# Patient Record
Sex: MALE | Race: WHITE | NOT HISPANIC OR LATINO | Employment: UNEMPLOYED | URBAN - METROPOLITAN AREA
[De-identification: names, ages, dates, MRNs, and addresses within clinical notes are randomized per-mention and may not be internally consistent; named-entity substitution may affect disease eponyms.]

---

## 2023-06-28 ENCOUNTER — HOSPITAL ENCOUNTER (EMERGENCY)
Facility: CLINIC | Age: 28
Discharge: HOME | End: 2023-06-28
Attending: EMERGENCY MEDICINE | Admitting: EMERGENCY MEDICINE
Payer: MEDICAID

## 2023-06-28 VITALS
OXYGEN SATURATION: 95 % | HEIGHT: 69 IN | BODY MASS INDEX: 43.25 KG/M2 | HEART RATE: 116 BPM | SYSTOLIC BLOOD PRESSURE: 128 MMHG | WEIGHT: 292 LBS | RESPIRATION RATE: 16 BRPM | DIASTOLIC BLOOD PRESSURE: 89 MMHG | TEMPERATURE: 98.8 F

## 2023-06-28 DIAGNOSIS — R00.2 PALPITATIONS: ICD-10-CM

## 2023-06-28 DIAGNOSIS — F41.9 ANXIETY: ICD-10-CM

## 2023-06-28 PROCEDURE — 99283 EMERGENCY DEPT VISIT LOW MDM: CPT | Performed by: EMERGENCY MEDICINE

## 2023-06-28 PROCEDURE — 250N000013 HC RX MED GY IP 250 OP 250 PS 637: Performed by: EMERGENCY MEDICINE

## 2023-06-28 PROCEDURE — 93010 ELECTROCARDIOGRAM REPORT: CPT | Performed by: EMERGENCY MEDICINE

## 2023-06-28 PROCEDURE — 93005 ELECTROCARDIOGRAM TRACING: CPT | Performed by: EMERGENCY MEDICINE

## 2023-06-28 PROCEDURE — 99283 EMERGENCY DEPT VISIT LOW MDM: CPT | Mod: 25 | Performed by: EMERGENCY MEDICINE

## 2023-06-28 RX ORDER — VENLAFAXINE HYDROCHLORIDE 75 MG/1
75 CAPSULE, EXTENDED RELEASE ORAL
COMMUNITY
Start: 2023-04-03

## 2023-06-28 RX ORDER — LORAZEPAM 1 MG/1
1 TABLET ORAL ONCE
Status: COMPLETED | OUTPATIENT
Start: 2023-06-28 | End: 2023-06-28

## 2023-06-28 RX ORDER — LORAZEPAM 1 MG/1
.5-1 TABLET ORAL EVERY 8 HOURS PRN
Qty: 15 TABLET | Refills: 0 | Status: SHIPPED | OUTPATIENT
Start: 2023-06-28

## 2023-06-28 RX ORDER — LORAZEPAM 1 MG/1
.5-1 TABLET ORAL EVERY 8 HOURS PRN
Qty: 15 TABLET | Refills: 0 | Status: SHIPPED | OUTPATIENT
Start: 2023-06-28 | End: 2023-06-28

## 2023-06-28 RX ADMIN — LORAZEPAM 1 MG: 1 TABLET ORAL at 17:07

## 2023-06-28 ASSESSMENT — ACTIVITIES OF DAILY LIVING (ADL): ADLS_ACUITY_SCORE: 35

## 2023-06-28 NOTE — ED PROVIDER NOTES
"  History     Chief Complaint   Patient presents with     Palpitations     Panic Attack     HPI  Braulio Ng is a 27 year old male with history of anxiety who presents emergency department for evaluation of anxiety, an anxiety attack with palpitations.  3 days ago while at rest he had a brief, seconds, sensation of 'the upper and lower chambers of his heart beating in dyssynchrony', which has heightened his anxiety since that time.  In the past he has only had frequent intermittent palpations which are sensations of a skipped beats.  He has never had syncope.  When he had his brief episode of palpitations 3 days ago he had no associated chest pain, shortness of breath, nausea, sweating or diaphoresis.  No exertional chest pains or other symptoms of angina or ACS.  No recent URI symptoms or signs or symptoms of PE/DVT.  No family history of unusual heart disease or sudden cardiac death.  No other pertinent history or acute complaints or concerns.      Allergies:  Allergies   Allergen Reactions     Penicillins Nausea and Vomiting     Erythromycin Rash       Problem List:    There are no problems to display for this patient.       Past Medical History:    Past Medical History:   Diagnosis Date     Anxiety        Past Surgical History:    History reviewed. No pertinent surgical history.    Family History:    History reviewed. No pertinent family history.    Social History:  Marital Status:  Single [1]        Medications:    LORazepam (ATIVAN) 1 MG tablet  venlafaxine (EFFEXOR XR) 75 MG 24 hr capsule        Review of Systems  As mentioned in the HPI, in addition focused review of systems was negative.    Physical Exam   BP: 125/88  Pulse: 120  Temp: 99  F (37.2  C)  Resp: 18  Height: 175.3 cm (5' 9\")  Weight: 132.5 kg (292 lb)  SpO2: 99 %      Physical Exam  Vitals and nursing note reviewed.   Constitutional:       General: He is not in acute distress.     Appearance: Normal appearance. He is well-developed. He is " not ill-appearing or diaphoretic.   HENT:      Head: Normocephalic and atraumatic.      Right Ear: External ear normal.      Left Ear: External ear normal.   Eyes:      General: No scleral icterus.     Extraocular Movements: Extraocular movements intact.      Conjunctiva/sclera: Conjunctivae normal.   Neck:      Thyroid: No thyromegaly.      Trachea: No tracheal deviation.   Cardiovascular:      Rate and Rhythm: Normal rate and regular rhythm.      Heart sounds: Normal heart sounds. No murmur heard.     No friction rub. No gallop.   Pulmonary:      Effort: Pulmonary effort is normal. No respiratory distress.      Breath sounds: Normal breath sounds. No wheezing, rhonchi or rales.   Musculoskeletal:         General: No swelling or tenderness. Normal range of motion.      Cervical back: Normal range of motion and neck supple.      Right lower leg: No edema.      Left lower leg: No edema.   Skin:     General: Skin is warm and dry.      Coloration: Skin is not pale.      Findings: No erythema or rash.   Neurological:      General: No focal deficit present.      Mental Status: He is alert and oriented to person, place, and time.   Psychiatric:         Behavior: Behavior normal.      Comments: Anxious affect.         ED Course           Procedures              EKG Interpretation:      Interpreted by Rafael Morillo MD  Time reviewed: 4:31 PM  Symptoms at time of EKG: Palpitations, anxiety  Rhythm: normal sinus   Rate: normal  Axis: normal  Ectopy: none  Conduction: normal  ST Segments/ T Waves: Non-specific ST-T wave changes  Q Waves: none  Comparison to prior: No old EKG available  Clinical Impression: Normal sinus rhythm, nonspecific ST-T wave changes, no old EKGs available for comparison.         No results found for this or any previous visit (from the past 24 hour(s)).    Medications   LORazepam (ATIVAN) tablet 1 mg (1 mg Oral $Given 6/28/23 1583)     He reports she has no medical insurance and was worried about the  cost of his evaluation today and refused laboratory work-up as recommended. He also declined referral for a cardiac monitor study to help facilitate his outpatient follow-up.  He did agree to follow-up with his primary care provider in [facility] as soon as possible for reevaluation and for follow-up.    Assessments & Plan (with Medical Decision Making)   27 year old male with history of anxiety who presents emergency department for evaluation of anxiety, an anxiety attack with palpitations.  3 days ago while at rest he had a brief, seconds, sensation of 'the upper and lower chambers of his heart beating in dyssynchrony', which has heightened his anxiety since that time.  In the past he has only had frequent intermittent palpations which are sensations of a skipped beats.  He has never had syncope.  When he had his brief episode of palpitations 3 days ago he had no chest pain or other symptoms of angina or ACS.    Unremarkable EKG and cardiac monitoring in the ED. He reports he has no medical insurance and iss worried about the cost of his evaluation today and declined laboratory work-up as recommended. He also declined referral for a cardiac monitor study to help facilitate his outpatient follow-up.  He did agree to follow-up with his primary care provider in [facility] as soon as possible for reevaluation and for follow-up. History and exam c/w anxiety. Doubt dysrhythmia, atypical ACS, PE/DVT or other emergent disease process.  I will Rx a small number of Ativan and have him increase Effexor/venlafaxine by one half, from 75 mg daily to 112.5 mg daily and he will follow-up with his primary care provider who prescribes this for him in the next week.  He declined laboratory evaluation referral for cardiac monitor study as was recommended as he is concerned about the cost of his evaluation and care because he currently has no health insurance, he is a student.  He agrees to return for recurrent palpitations,  syncope, any signs or symptoms of ACS or any new problems or concerns.    I have reviewed the nursing notes.    I have reviewed the findings, diagnosis, plan and need for follow up with the patient.    Current Discharge Medication List      START taking these medications    Details   LORazepam (ATIVAN) 1 MG tablet Take 0.5-1 tablets (0.5-1 mg) by mouth every 8 hours as needed for anxiety (or insomnia)  Qty: 15 tablet, Refills: 0             Final diagnoses:   Anxiety   Palpitations       6/28/2023   [facility] EMERGENCY DEPT     Rafael Morillo MD  07/02/23 9169

## 2023-06-28 NOTE — ED TRIAGE NOTES
Pt here with increased anxiety and palpitations. Pt states that he takes his meds daily. No chest pain. Denies smoking, no vaping. Minimal ETOH. No drug use.      Triage Assessment     Row Name 06/28/23 8607       Triage Assessment (Adult)    Airway WDL WDL       Respiratory WDL    Respiratory WDL WDL       Skin Circulation/Temperature WDL    Skin Circulation/Temperature WDL WDL       Cardiac WDL    Cardiac WDL rhythm    Pulse Rate & Regularity tachycardic       Peripheral/Neurovascular WDL    Peripheral Neurovascular WDL WDL       Cognitive/Neuro/Behavioral WDL    Cognitive/Neuro/Behavioral WDL WDL

## 2023-06-28 NOTE — DISCHARGE INSTRUCTIONS
Increase Effexor/Venlafaxine by one half, from 75 mg (1 tablet daily) to 112.5 mg daily (1.5 tablets daily).  Report this change in follow-up with your primary care provider in the next 1 week.

## 2023-06-28 NOTE — ED NOTES
Pt presents to ED, states he felt a palpitation 3 days ago, palpation has resolved, but now feels anxious and states he can't seem to get his anxiety down without being evaluated.

## 2023-06-28 NOTE — ED TRIAGE NOTES
Pt reports he has felt a few palpitations and has anxiety.     Writer and provider spoke with pt about treatment and diagnostic options in ED vs. UC. Pt agreed to be evaluated in the ED.

## 2024-04-05 ENCOUNTER — HOSPITAL ENCOUNTER (EMERGENCY)
Facility: CLINIC | Age: 29
Discharge: HOME | End: 2024-04-05
Attending: PHYSICIAN ASSISTANT | Admitting: PHYSICIAN ASSISTANT
Payer: COMMERCIAL

## 2024-04-05 VITALS
DIASTOLIC BLOOD PRESSURE: 85 MMHG | RESPIRATION RATE: 17 BRPM | HEART RATE: 117 BPM | SYSTOLIC BLOOD PRESSURE: 152 MMHG | OXYGEN SATURATION: 100 % | TEMPERATURE: 98.1 F

## 2024-04-05 DIAGNOSIS — F41.9 ANXIETY: ICD-10-CM

## 2024-04-05 PROCEDURE — G0463 HOSPITAL OUTPT CLINIC VISIT: HCPCS | Performed by: PHYSICIAN ASSISTANT

## 2024-04-05 PROCEDURE — 99214 OFFICE O/P EST MOD 30 MIN: CPT | Performed by: PHYSICIAN ASSISTANT

## 2024-04-05 RX ORDER — VENLAFAXINE HYDROCHLORIDE 75 MG/1
75 CAPSULE, EXTENDED RELEASE ORAL DAILY
Qty: 30 CAPSULE | Refills: 0 | Status: SHIPPED | OUTPATIENT
Start: 2024-04-05 | End: 2024-05-05

## 2024-04-05 ASSESSMENT — COLUMBIA-SUICIDE SEVERITY RATING SCALE - C-SSRS
2. HAVE YOU ACTUALLY HAD ANY THOUGHTS OF KILLING YOURSELF IN THE PAST MONTH?: NO
6. HAVE YOU EVER DONE ANYTHING, STARTED TO DO ANYTHING, OR PREPARED TO DO ANYTHING TO END YOUR LIFE?: NO
1. IN THE PAST MONTH, HAVE YOU WISHED YOU WERE DEAD OR WISHED YOU COULD GO TO SLEEP AND NOT WAKE UP?: NO

## 2024-04-05 ASSESSMENT — ACTIVITIES OF DAILY LIVING (ADL): ADLS_ACUITY_SCORE: 35

## 2024-04-05 NOTE — ED PROVIDER NOTES
History   No chief complaint on file.    HPI  Braulio Ng is a 28 year old male with history of anxiety who presents to urgent care requesting refill of venlafaxine XR.  Patient reports he has been on medication for approximate last 10 years states is scheduled to run out of current prescription within the next 2 days.  His last dose was yesterday.  He denies any current thoughts of self-harm or suicide.  He states that primary provider who typically prescribes medication is out of the office for the next several weeks.  He is in the process of reestablishing with psychologist/psychiatrist.      Allergies:  Allergies   Allergen Reactions    Penicillins Nausea and Vomiting    Erythromycin Rash       Problem List:    There are no problems to display for this patient.       Past Medical History:    Past Medical History:   Diagnosis Date    Anxiety        Past Surgical History:    No past surgical history on file.    Family History:    No family history on file.    Social History:  Marital Status:  Single [1]        Medications:    venlafaxine (EFFEXOR XR) 75 MG 24 hr capsule  LORazepam (ATIVAN) 1 MG tablet  venlafaxine (EFFEXOR XR) 75 MG 24 hr capsule      Review of Systems  Per HPI    Physical Exam   BP: (!) 152/85  Pulse: 117  Temp: 98.1  F (36.7  C)  Resp: 17  SpO2: 100 %  Physical Exam    GENERAL APPEARANCE: healthy, alert and no distress  EYES: EOMI,  PERRL, conjunctiva clear  RESP: lungs clear to auscultation - no rales, rhonchi or wheezes  CV: tachycardia, regular rhythm, normal S1 S2, no murmur noted  NEURO: Normal strength and tone, sensory exam grossly normal,  normal speech and mentation  PSYCH: Normal attention, mood and affect normal.  Normal speech cognition and memory  SKIN: no suspicious lesions or rashes    ED Course        Procedures       Critical Care time:  none        No results found for this or any previous visit (from the past 24 hour(s)).    Medications - No data to  display    Assessments & Plan (with Medical Decision Making)     I have reviewed the nursing notes.    I have reviewed the findings, diagnosis, plan and need for follow up with the patient.     New Prescriptions    VENLAFAXINE (EFFEXOR XR) 75 MG 24 HR CAPSULE    Take 1 capsule (75 mg) by mouth daily for 30 days     Final diagnoses:   Anxiety     28-year-old male with history of anxiety presents to urgent care requesting refill of venlafaxine stating he is scheduled to run out within the next 48 hours.  He had elevated blood pressure, tachycardia upon arrival, remainder vital signs were stable.  Physical exam findings were benign.  Patient denies any suicidal thoughts at this time.  Given potential risk factors of withdrawing from medication I did agree to provide description for 1 month supply.  All further refills through PCP or mental health provider, mental health  referral placed. Worrisome reasons to return to ER/UC discussed.    Disclaimer: This note consists of symbols derived from keyboarding, dictation, and/or voice recognition software. As a result, there may be errors in the script that have gone undetected.  Please consider this when interpreting information found in the chart.      4/5/2024   [facility] EMERGENCY DEPT       Justa Chavarria PA-C  04/09/24 0939

## 2024-04-29 ASSESSMENT — PATIENT HEALTH QUESTIONNAIRE - PHQ9
SUM OF ALL RESPONSES TO PHQ QUESTIONS 1-9: 7
10. IF YOU CHECKED OFF ANY PROBLEMS, HOW DIFFICULT HAVE THESE PROBLEMS MADE IT FOR YOU TO DO YOUR WORK, TAKE CARE OF THINGS AT HOME, OR GET ALONG WITH OTHER PEOPLE: SOMEWHAT DIFFICULT
SUM OF ALL RESPONSES TO PHQ QUESTIONS 1-9: 7

## 2024-04-30 ENCOUNTER — VIRTUAL VISIT (OUTPATIENT)
Dept: BEHAVIORAL HEALTH | Facility: CLINIC | Age: 29
End: 2024-04-30
Payer: COMMERCIAL

## 2024-04-30 DIAGNOSIS — F41.9 ANXIETY: ICD-10-CM

## 2024-04-30 PROCEDURE — 90834 PSYTX W PT 45 MINUTES: CPT | Mod: 95

## 2024-04-30 ASSESSMENT — COLUMBIA-SUICIDE SEVERITY RATING SCALE - C-SSRS
2. HAVE YOU ACTUALLY HAD ANY THOUGHTS OF KILLING YOURSELF?: NO
1. HAVE YOU WISHED YOU WERE DEAD OR WISHED YOU COULD GO TO SLEEP AND NOT WAKE UP?: NO

## 2024-04-30 NOTE — PROGRESS NOTES
[facility] Primary Care: Integrated Behavioral Health  2024    Behavioral Health Clinician Progress Note    Patient Name: Braulio Ng        Service Type:  Individual      Service Location:   Phone call (patient / identified key support person reached)     Session Start Time: 1:30pm  Session End Time: 2:10pm      Session Length: 38 - 52      Attendees: Patient     Service Modality:  Video Visit:      Provider verified identity through the following two step process.  Patient provided:  Patient  and Patient address    Telemedicine Visit: The patient's condition can be safely assessed and treated via synchronous audio and visual telemedicine encounter.      Reason for Telemedicine Visit: Patient has requested telehealth visit    Originating Site (Patient Location): Patient's home    Distant Site (Provider Location): [facility] MENTAL [facility] & ADDICTION [facility]    Consent:  The patient/guardian has verbally consented to: the potential risks and benefits of telemedicine (video visit) versus in person care; bill my insurance or make self-payment for services provided; and responsibility for payment of non-covered services.     Patient would like the video invitation sent by:  My Chart    Mode of Communication:  Video Conference via [employer]    Distant Location (Provider):  On-site    As the provider I attest to compliance with applicable laws and regulations related to telemedicine.    Visit Activities (Refresh list every visit): NEW, [facility] Only, and Referral - Mental Health    Diagnostic Assessment Date: Will complete in the next few sessions, not completed due to time constraints.    Treatment Plan Review Date: Will complete in the next few sessions, not completed due to time constraints.    See Flowsheets for today's PHQ-9 and MAGNOLIA-7 results  Previous PHQ-9:       2024     1:53 PM   PHQ-9 SCORE   PHQ-9 Total Score MyChart 7 (Mild depression)   PHQ-9 Total Score  7     DATA  Extended Session (60+ minutes): No  Interactive Complexity: No  Crisis: No  [facility] Patient: No    Treatment Objective(s) Addressed in This Session:  Target Behavior(s): disease management/lifestyle changes ongoing anxiety     Anxiety: will experience a reduction in anxiety, will develop more effective coping skills to manage anxiety symptoms, will develop healthy cognitive patterns and beliefs, and will increase ability to function adaptively    Current Stressors / Issues:  Pt stated that he is interested in therapy to address his anxiety.  Discuss and provide education on coping skills for him. Prompt follow through.  Provide education on ART therapy and how to find a therapist who specializes in it.  Encourage follow through.      Progress on Treatment Objective(s) / Homework:  New Objective established this session - CONTEMPLATION (Considering change and yet undecided); Intervened by assessing the negative and positive thinking (ambivalence) about behavior change    Motivational Interviewing    MI Intervention: Expressed Empathy/Understanding, Supported Autonomy, Collaboration, Evocation, and Reflections: simple and complex     Change Talk Expressed by the Patient: Desire to change Ability to change    Provider Response to Change Talk: A - Affirmed patient's thoughts, decisions, or attempts at behavior change and R - Reflected patient's change talk    Assessments completed prior to visit:  The following assessments were completed by patient for this visit:  PHQ9:       4/29/2024     1:53 PM   PHQ-9 SCORE   PHQ-9 Total Score MyChart 7 (Mild depression)   PHQ-9 Total Score 7     GAD7:        No data to display              CAGE-AID:       4/23/2024     6:04 PM   CAGE-AID Total Score   Total Score 0   Total Score MyChart 0 (A total score of 2 or greater is considered clinically significant)     PROMIS 10-Global Health (only subscores and total score):       4/23/2024     6:03 PM   PROMIS-10 Scores Only    Global Mental Health Score 14   Global Physical Health Score 15   PROMIS TOTAL - SUBSCORES 29     [place] Suicide Severity Rating Scale (Lifetime/Recent)      4/5/2024     2:34 PM 4/30/2024     1:36 PM   [place] Suicide Severity Rating (Lifetime/Recent)   Q1 Wished to be Dead (Past Month) 0-->no    Q2 Suicidal Thoughts (Past Month) 0-->no    Q6 Suicide Behavior (Lifetime) 0-->no    Level of Risk per Screen no risks indicated    Q1 Wish to be Dead (Lifetime)  N   Q2 Non-Specific Active Suicidal Thoughts (Lifetime)  N       Care Plan review completed: Yes    Medication Review:  No current psychiatric medications prescribed    Medication Compliance:  NA    Changes in Health Issues:   None reported    Chemical Use Review:   Substance Use: Chemical use reviewed, no active concerns identified      Tobacco Use: No current tobacco use.      Assessment: Current Emotional / Mental Status (status of significant symptoms):  Risk status (Self / Other harm or suicidal ideation)  Patient denies a history of suicidal ideation, suicide attempts, self-injurious behavior, homicidal ideation, homicidal behavior, and and other safety concerns  Patient denies current fears or concerns for personal safety.  Patient denies current or recent suicidal ideation or behaviors.  Patient denies current or recent homicidal ideation or behaviors.  Patient denies current or recent self injurious behavior or ideation.  Patient denies other safety concerns.  A safety and risk management plan has not been developed at this time, however patient was encouraged to call [facility] should there be a change in any of these risk factors.    Appearance:   Unable to assess   Eye Contact:   Unable to assess   Psychomotor Behavior: Unable to assess   Attitude:   Cooperative   Orientation:   All  Speech   Rate / Production: Normal    Volume:  Normal   Mood:    Normal  Affect:    Appropriate   Thought Content:  Clear   Thought Form:  Coherent  Logical    Insight:    Good     Diagnoses:  1. Anxiety      Collateral Reports Completed:  Not Applicable    Plan: (Homework, other):  Patient was given information about behavioral services and encouraged to schedule a follow up appointment with the clinic [facility] in 3 weeks.  He was also given information about mental health symptoms and treatment options .  CD Recommendations: No indications of CD issues.       CHERRIE Ware    ______________________________________________________________________  Answers submitted by the patient for this visit:  Patient Health Questionnaire (Submitted on 4/29/2024)  If you checked off any problems, how difficult have these problems made it for you to do your work, take care of things at home, or get along with other people?: Somewhat difficult  PHQ9 TOTAL SCORE: 7

## 2024-05-22 ENCOUNTER — VIRTUAL VISIT (OUTPATIENT)
Dept: BEHAVIORAL HEALTH | Facility: CLINIC | Age: 29
End: 2024-05-22
Payer: COMMERCIAL

## 2024-05-22 DIAGNOSIS — F41.1 GENERALIZED ANXIETY DISORDER: Primary | ICD-10-CM

## 2024-05-22 PROCEDURE — 90791 PSYCH DIAGNOSTIC EVALUATION: CPT | Mod: 95

## 2024-05-22 ASSESSMENT — ANXIETY QUESTIONNAIRES
7. FEELING AFRAID AS IF SOMETHING AWFUL MIGHT HAPPEN: NOT AT ALL
1. FEELING NERVOUS, ANXIOUS, OR ON EDGE: MORE THAN HALF THE DAYS
6. BECOMING EASILY ANNOYED OR IRRITABLE: MORE THAN HALF THE DAYS
2. NOT BEING ABLE TO STOP OR CONTROL WORRYING: SEVERAL DAYS
IF YOU CHECKED OFF ANY PROBLEMS ON THIS QUESTIONNAIRE, HOW DIFFICULT HAVE THESE PROBLEMS MADE IT FOR YOU TO DO YOUR WORK, TAKE CARE OF THINGS AT HOME, OR GET ALONG WITH OTHER PEOPLE: SOMEWHAT DIFFICULT
3. WORRYING TOO MUCH ABOUT DIFFERENT THINGS: MORE THAN HALF THE DAYS
GAD7 TOTAL SCORE: 11
5. BEING SO RESTLESS THAT IT IS HARD TO SIT STILL: SEVERAL DAYS
GAD7 TOTAL SCORE: 11

## 2024-05-22 ASSESSMENT — PATIENT HEALTH QUESTIONNAIRE - PHQ9: 5. POOR APPETITE OR OVEREATING: NEARLY EVERY DAY

## 2024-05-22 NOTE — PROGRESS NOTES
"[facility] Primary Care: : Integrated Behavioral Health    PATIENT'S NAME: Braulio Ng  PREFERRED NAME: Braulio  PRONOUNS:       MRN: [id number]  : 1995  ADDRESS[facility] [address] [address] [facility] [address] [address]  [facility]  [id number]  DATE OF SERVICE: 24  START TIME: 11:00am  END TIME: 11:34am  PREFERRED PHONE: [phone]  May we leave a program related message: Yes  EMERGENCY CONTACT: was obtained.  SERVICE MODALITY:  Video Visit:      Provider verified identity through the following two step process.  Patient provided:  Patient  and Patient is known previously to provider    Telemedicine Visit: The patient's condition can be safely assessed and treated via synchronous audio and visual telemedicine encounter.      Reason for Telemedicine Visit: Patient has requested telehealth visit    Originating Site (Patient Location): Patient's home    Distant Site (Provider Location): [facility]    Consent:  The patient/guardian has verbally consented to: the potential risks and benefits of telemedicine (video visit) versus in person care; bill my insurance or make self-payment for services provided; and responsibility for payment of non-covered services.     Patient would like the video invitation sent by:  My Chart    Mode of Communication:  Video Conference via Amwell    Distant Location (Provider):  On-site    As the provider I attest to compliance with applicable laws and regulations related to telemedicine.    UNIVERSAL ADULT Mental Health DIAGNOSTIC ASSESSMENT    Identifying Information:  Patient is a 28 year old,  individual.  Patient was referred for an assessment by hospital.  Patient attended the session alone.    Chief Complaint:   The reason for seeking services at this time is: \"anxiety\".  The problem(s) began 09.    Patient has attempted to resolve these concerns in the past through therapy, twice before .    Social/Family " History:  Patient reported they grew up in [place] [place] [facility].  They were raised by biological parents.  Parents were always together.  He stated that he has two younger siblings, a brother and sister.   Patient reported that their childhood was started off stable, until his sister was born.  He reports that his sister got a lot of attention and his parents began to run into financial problems.  He stated that financial problems increased between 2000 to 2005, and his brother was born during this time.  He stated that his parents were always looking for places to afford.  He stated that the family started out in an apartment, then in a condo and then into a house and the market crashed.  Patient described their current relationships with family of origin as trying to contact them, but they are usually pretty busy.  So he will wait for them to contact him.  He stated that he has lived in [place] for almost three years, met a friend online and now they are in a relationship.     The patient describes their cultural background as .  Cultural influences and impact on patient's life structure, values, norms, and healthcare: Moved a lot..  Contextual influences on patient's health include: Contextual Factors: Family Factors complicated family dynamics due to him moving out of state and strained relationships .  These factors will be addressed in the Preliminary Treatment plan. Patient identified their preferred language to be English. Patient reported they does not need the assistance of an  or other support involved in therapy.     Patient reported had no significant delays in developmental tasks.   He stated that he was diagnosed with ADHD, did take medications for a bit but mom reported to him that he became violent. He stated that he did get assistance with resource room while in elementary.  Patient's highest education level was associate degree / vocational certificate.  Patient identified the following  learning problems: none reported.  Modifications will not be used to assist communication in therapy.  Patient reports they are  able to understand written materials.    Patient's current relationship status is has a partner or significant other for the last three years.   Patient identified their sexual orientation as aguirre.  Patient reported having zero child(susie). Patient identified partner; pets as part of their support system.  Patient identified the quality of these relationships as good.      Patient's current living/housing situation involves staying with someone.  The immediate members of family and household include laura coronel, Karie,partner  and they report that housing is stable.  Patient is currently unemployed.  Patient reports their finances are obtained through partner. Patient does identify finances as a current stressor.      Patient reported that they have not been involved with the legal system.  Patient does not report being under probation/ parole/ jurisdiction.     Patient's Strengths and Limitations:  Patient identified the following strengths or resources that will help them succeed in treatment: commitment to health and well being, friends / good social support, insight, intelligence, motivation, and strong social skills. Things that may interfere with the patient's success in treatment include: lack of family support and no employment .     Assessments:  The following assessments were completed by patient for this visit:  PHQ9:       4/29/2024     1:53 PM   PHQ-9 SCORE   PHQ-9 Total Score MyChart 7 (Mild depression)   PHQ-9 Total Score 7     GAD7:       5/22/2024    11:14 AM   MAGNOLIA-7 SCORE   Total Score 11     CAGE-AID:       4/23/2024     6:04 PM   CAGE-AID Total Score   Total Score 0   Total Score MyChart 0 (A total score of 2 or greater is considered clinically significant)     PROMIS 10-Global Health (only subscores and total score):       4/23/2024     6:03 PM   PROMIS-10 Scores Only    Global Mental Health Score 14   Global Physical Health Score 15   PROMIS TOTAL - SUBSCORES 29     [place] Suicide Severity Rating Scale (Lifetime/Recent)      4/5/2024     2:34 PM 4/30/2024     1:36 PM   [place] Suicide Severity Rating (Lifetime/Recent)   Q1 Wished to be Dead (Past Month) 0-->no    Q2 Suicidal Thoughts (Past Month) 0-->no    Q6 Suicide Behavior (Lifetime) 0-->no    Level of Risk per Screen no risks indicated    Q1 Wish to be Dead (Lifetime)  N   Q2 Non-Specific Active Suicidal Thoughts (Lifetime)  N     Personal and Family Medical History:  Patient does report a family history of mental health concerns.  He stated that his paternal side of the family struggles with anxiety times.  Patient reports family history is not on file.    Patient does report Mental Health Diagnosis and/or Treatment.  Patient reported the following previous diagnoses which include(s): ADHD; an anxiety disorder .  Patient reported symptoms began 2009.  Patient has received mental health services in the past:  therapy.  Psychiatric Hospitalizations: none. Patient denies a history of civil commitment.      Currently, patient none  is receiving other mental health services.         Patient has not had a physical exam to rule out medical causes for current symptoms.  Date of last physical exam was greater than a year ago and client was encouraged to schedule an exam with PCP. The patient has a [place] Primary Care Provider, who is named Tahmina Waddell.  Patient reports no current medical concerns.  Patient denies any issues with pain..   There are not significant appetite / nutritional concerns / weight changes.   Patient does not report a history of head injury / trauma / cognitive impairment.      Patient reports current meds as:   Current Outpatient Medications   Medication Sig Dispense Refill    LORazepam (ATIVAN) 1 MG tablet Take 0.5-1 tablets (0.5-1 mg) by mouth every 8 hours as needed for anxiety (or insomnia) 15  tablet 0    venlafaxine (EFFEXOR XR) 75 MG 24 hr capsule Take 1 capsule (75 mg) by mouth daily for 30 days 30 capsule 0    venlafaxine (EFFEXOR XR) 75 MG 24 hr capsule Take 75 mg by mouth       No current facility-administered medications for this visit.     Medication Adherence:  Patient reports taking.    Patient Allergies:    Allergies   Allergen Reactions    Penicillins Nausea and Vomiting    Erythromycin Rash     Medical History:    Past Medical History:   Diagnosis Date    Anxiety      Current Mental Status Exam:   Appearance:  Unable to assess    Eye Contact:  Unable to assess   Psychomotor:  Unable to assess       Gait / station:  Unable to assess  Attitude / Demeanor: Cooperative   Speech      Rate / Production: Normal/ Responsive      Volume:  Normal  volume      Language:  intact  Mood:   Anxious   Affect:   Appropriate    Thought Content: Clear   Thought Process: Coherent       Associations: No loosening of associations  Insight:   Fair   Judgment:  Intact   Orientation:  All  Attention/concentration: Good    Substance Use:   Patient did report a family history of substance use concerns including alcoholism on his mother's side of the family.  Patient has not received chemical dependency treatment in the past.  Patient has not ever been to detox.  Patient is not currently receiving any chemical dependency treatment.       Substance History of use Age of first use Date of last use     Pattern and duration of use (include amounts and frequency)   Alcohol used in the past   24 11/25/23 REPORTS SUBSTANCE USE: N/A   Cannabis   never used     REPORTS SUBSTANCE USE: N/A     Amphetamines   never used     REPORTS SUBSTANCE USE: N/A   Cocaine/crack    never used       REPORTS SUBSTANCE USE: N/A   Hallucinogens never used         REPORTS SUBSTANCE USE: N/A   Inhalants never used         REPORTS SUBSTANCE USE: N/A   Heroin never used         REPORTS SUBSTANCE USE: N/A   Other Opiates never used     REPORTS SUBSTANCE  USE: N/A   Benzodiazepine   never used     REPORTS SUBSTANCE USE: N/A   Barbiturates never used     REPORTS SUBSTANCE USE: N/A   Over the counter meds never used     REPORTS SUBSTANCE USE: N/A   Caffeine currently use 8   REPORTS SUBSTANCE USE: N/A   Nicotine  never used     REPORTS SUBSTANCE USE: N/A   Other substances not listed above:  Identify:  never used     REPORTS SUBSTANCE USE: N/A     Patient reported the following problems as a result of their substance use: no problems, not applicable.    Substance Use: No symptoms    Based on the negative CAGE score and clinical interview there  are not indications of drug or alcohol abuse.    Significant Losses / Trauma / Abuse / Neglect Issues:   Patient did not serve in the .  There are indications or report of significant loss, trauma, abuse or neglect issues related to: are no indications and client denies any losses, trauma, abuse, or neglect concerns.  Concerns for possible neglect are not present.     Safety Assessment:   Patient denies current homicidal ideation and behaviors.  Patient denies current self-injurious ideation and behaviors.    Patient denied risk behaviors associated with substance use.   Patient denies any high risk behaviors associated with mental health symptoms.  Patient reports the following current concerns for their personal safety: None.  Patient reports there are not firearms in the house.      History of Safety Concerns:  Patient denied a history of homicidal ideation.     Patient denied a history of personal safety concerns.    Patient denied a history of assaultive behaviors.    Patient denied a history of sexual assault behaviors.     Patient denied a history of risk behaviors associated with substance use.  Patient denies any history of high risk behaviors associated with mental health symptoms.  Patient reports the following protective factors: forward or future oriented thinking; effective problem solving skills; healthy  fear of risky behaviors or pain    Risk Plan:  See Recommendations for Safety and Risk Management Plan    Review of Symptoms per patient report:   Depression: Lack of interest, Feelings of hopelessness, Feelings of helplessness, Ruminations, and Feeling sad, down, or depressed  Gertrude:  No Symptoms  Psychosis: No Symptoms  Anxiety: Excessive worry, Nervousness, Physical complaints, such as headaches, stomachaches, muscle tension, Fears/phobias potential medical issue, and Ruminations  Panic:  No symptoms  Post Traumatic Stress Disorder:  No Symptoms   Eating Disorder: No Symptoms  ADD / ADHD:  No symptoms  Conduct Disorder: No symptoms  Autism Spectrum Disorder: No symptoms  Obsessive Compulsive Disorder: No Symptoms    Patient reports the following compulsive behaviors and treatment history:  none .      Diagnostic Criteria:   Generalized Anxiety Disorder  A. Excessive anxiety and worry about a number of events or activities (such as work or school performance).   B. The person finds it difficult to control the worry.   - Restlessness or feeling keyed up or on edge.    - Being easily fatigued.    - Irritability.    - Muscle tension.   D. The focus of the anxiety and worry is not confined to features of an Axis I disorder.  E. The anxiety, worry, or physical symptoms cause clinically significant distress or impairment in social, occupational, or other important areas of functioning.   F. The disturbance is not due to the direct physiological effects of a substance (e.g., a drug of abuse, a medication) or a general medical condition (e.g., hyperthyroidism) and does not occur exclusively during a Mood Disorder, a Psychotic Disorder, or a Pervasive Developmental Disorder.    Functional Status:  Patient reports the following functional impairments:  health maintenance, management of the household and or completion of tasks, organization, relationship(s), self-care, social interactions, and work / vocational  responsibilities.     Nonprogrammatic care:  Patient is requesting basic services to address current mental health concerns.    Clinical Summary:  1. Psychosocial, Cultural and Contextual Factors: moved to [place], medical anxiety  .  2. Principal DSM5 Diagnoses  (Sustained by DSM5 Criteria Listed Above):   300.02 (F41.1) Generalized Anxiety Disorder.  3. Prognosis: Return to Baseline Functioning, Expect Improvement, Relieve Acute Symptoms, Maintain Current Status / Prevent Deterioration, and Unknown.  4. Likely consequences of symptoms if not treated: worsening symptoms, no change.  5. Client strengths include:  caring, empathetic, has a previous history of therapy, insightful, intelligent, motivated, open to learning, open to suggestions / feedback, support of family, friends and providers, wants to learn, willing to ask questions, and willing to relate to others .     Recommendations:     1. Plan for Safety and Risk Management:   Safety and Risk: Recommended that patient call 911 or go to the local ED should there be a change in any of these risk factors..          Report to child / adult protection services was NA.     2. Patient's identified  No concerns .     3. Initial Treatment will focus on:    Anxiety - education on anxiety, coping skills and processing through relationships .     4. Resources/Service Plan:    services are not indicated.   Modifications to assist communication are not indicated.   Additional disability accommodations are not indicated.      5. Collaboration:   Collaboration / coordination of treatment will be initiated with the following  support professionals: primary care physician.      6.  Referrals:   The following referral(s) will be initiated: Outpatient Mental Steve Therapy, potentially focusing on ART.       Clinical Substantiation/medical necessity for the above recommendations:  It is medically necessary for patient to receive outpatient individual therapy. If services are  not provided, patient is at risk for increased anxiety and depressive symptoms, social isolation, poor relationships with family, higher level of care and difficulties within relationships.  It is medically necessary that patient participate in weekly/biweekly outpatient therapy services. Therapy services will be completed at the clinic and or via telehelath.     7. HAKEEM:    HAKEEM:  Discussed the general effects of drugs and alcohol on health and well-being. Provider gave patient printed information about the  effects of chemical use on their health and well being. Recommendations:  none at this time .     8. Records:   These were not available for review at time of assessment.   Information in this assessment was obtained from the medical record and  provided by patient who is a fair historian.    Patient will have open access to their mental health medical record.    9.   Interactive Complexity: No    10. Safety Plan: No safety plan at this this time due to no history of SI or attempts.  Provided information on 988 and encouraged him to follow through reaching out to them if needed.      Provider Name/ Credentials:  DARIUS Ware, LICKARLY  May 22, 2024

## 2025-01-05 ENCOUNTER — HOSPITAL ENCOUNTER (EMERGENCY)
Facility: CLINIC | Age: 30
Discharge: HOME | End: 2025-01-05
Attending: NURSE PRACTITIONER | Admitting: NURSE PRACTITIONER
Payer: COMMERCIAL

## 2025-01-05 VITALS
HEART RATE: 94 BPM | RESPIRATION RATE: 16 BRPM | TEMPERATURE: 99.6 F | SYSTOLIC BLOOD PRESSURE: 156 MMHG | OXYGEN SATURATION: 95 % | DIASTOLIC BLOOD PRESSURE: 76 MMHG

## 2025-01-05 DIAGNOSIS — J06.9 VIRAL UPPER RESPIRATORY ILLNESS: ICD-10-CM

## 2025-01-05 LAB
FLUAV RNA SPEC QL NAA+PROBE: NEGATIVE
FLUBV RNA RESP QL NAA+PROBE: NEGATIVE
RSV RNA SPEC NAA+PROBE: NEGATIVE
SARS-COV-2 RNA RESP QL NAA+PROBE: NEGATIVE

## 2025-01-05 PROCEDURE — 87637 SARSCOV2&INF A&B&RSV AMP PRB: CPT | Performed by: PHYSICIAN ASSISTANT

## 2025-01-05 PROCEDURE — 99213 OFFICE O/P EST LOW 20 MIN: CPT | Performed by: NURSE PRACTITIONER

## 2025-01-05 PROCEDURE — G0463 HOSPITAL OUTPT CLINIC VISIT: HCPCS

## 2025-01-05 RX ORDER — GUAIFENESIN/DEXTROMETHORPHAN 100-10MG/5
10 SYRUP ORAL EVERY 4 HOURS PRN
Qty: 236 ML | Refills: 0 | Status: SHIPPED | OUTPATIENT
Start: 2025-01-05 | End: 2025-01-10

## 2025-01-05 ASSESSMENT — ACTIVITIES OF DAILY LIVING (ADL): ADLS_ACUITY_SCORE: 41

## 2025-01-05 NOTE — ED PROVIDER NOTES
Chief Complaint:   Chief Complaint   Patient presents with    Fever         HPI:   Braulio Ng is a 29 year old male who presents to the UC/ED with a 2 day history of congestion, ear pressure, non productive cough, and achiness new onset of fever today..  He has tried Profen without improvement of symptoms.  He denies currently having diarrhea, nausea, and vomiting.  He has been exposed to ill contacts.   Medical history/ Predisposing factors include:of: None.      Problem List:    There are no active problems to display for this patient.       Past Medical History:    Past Medical History:   Diagnosis Date    Anxiety        Past Surgical History:    No past surgical history on file.      Meds:   Current Outpatient Medications   Medication Sig Dispense Refill    guaiFENesin-dextromethorphan (ROBITUSSIN DM) 100-10 MG/5ML syrup Take 10 mLs by mouth every 4 hours as needed for cough. 236 mL 0    LORazepam (ATIVAN) 1 MG tablet Take 0.5-1 tablets (0.5-1 mg) by mouth every 8 hours as needed for anxiety (or insomnia) 15 tablet 0    venlafaxine (EFFEXOR XR) 75 MG 24 hr capsule Take 1 capsule (75 mg) by mouth daily for 30 days 30 capsule 0    venlafaxine (EFFEXOR XR) 75 MG 24 hr capsule Take 75 mg by mouth         Allergies:   Allergies   Allergen Reactions    Penicillins Nausea and Vomiting    Erythromycin Rash       Medications updated and reviewed.  Past, family and surgical history is updated and reviewed in the record.     Review of Systems:  General: see HPI  HEENT: see HPI  Respiratory: see HPI    Physical Exam:   BP (!) 156/76   Pulse 94   Temp 99.6  F (37.6  C) (Tympanic)   Resp 16   SpO2 95%      General: No acute distress on arrival  Head: normocephalic, non-traumatic.  Eyes: Non-reddened conjunctiva, no icterus, noninjected, normal pupillary response to light accommodation bilaterally.  Ears: Left ear: TM intact, middle ear non-erythemic, no purulence, canal non-erythemic, patent. Right ear: TM intact,  middle ear non-erythemic without purulence, canal non-erythremic, patent.  Nose: Non-erythemic, no purulence present no edema, patent nostrils.  Mouth/Throat: No oral lesions, moist mucous membranes, Non-erythemic, midline uvula non enlarged tonsils or exudates present.  Has clear postnasal drainage present.  No cervical adenopathy present..  CV: Regular rate and rhythm, no cyanosis.  Respiratory: Nonlabored, occasional scattered rhonchi that clears with cough clear middle and bases bilateral.  Abdomen: NT, ND, normal bowel sounds present  Skin: No rashes, lesions, normal color.  Neuro: Normal, active, age-appropriate. Normal response to verbal stimuli. No obvious focal deficits.      Medical Decision Making:  Upper respiratory infection symptoms with Normal vitals.  CXR is not indicated.  Rapid Strep test is not indicated. COVID/RSV/Influenza A and B testing is    Results for orders placed or performed during the hospital encounter of 01/05/25 (from the past 48 hours)   Influenza A/B, RSV and SARS-CoV2 PCR (COVID-19) Nasopharyngeal    Specimen: Nasopharyngeal; Swab   Result Value Ref Range    Influenza A PCR Negative Negative    Influenza B PCR Negative Negative    RSV PCR Negative Negative    SARS CoV2 PCR Negative Negative    Narrative    Testing was performed using the Xpert Xpress CoV2/Flu/RSV Assay on the [employer] GeneXpert Instrument. This test should be ordered for the detection of SARS-CoV2, influenza, and RSV viruses in individuals with signs and symptoms of respiratory tract infection. This test is for in vitro diagnostic use under the [place] FDA for laboratories certified under CLIA to perform high or moderate complexity testing. This test has been [place] FDA cleared. A negative result does not rule out the presence of PCR inhibitors in the specimen or target RNA in concentration below the limit of detection for the assay. If only one viral target is positive but coinfection with multiple targets is suspected,  the sample should be re-tested with another FDA cleared, approved, or authorized test, if coninfection would change clinical management. This test was validated by the [facility]. These laboratories are certified under the Clinical Laboratory Improvement Amendments of 1988 (CLIA-88) as qualified to perfom high complexity laboratory testing.       Assessment:  Viral upper respiratory illness  Testing for RSV, COVID, influenza was negative today.      Plan:   Tylenol, Ibuprofen, and Fluids, Robitussin DM ordered for coughing as needed as needed for every 4 hours.      Reassurance given regarding diagnosis and recommendations.  Discussed home treatment with antipyretics Prescriptions .  Recommend follow up in primary care as needed, or sooner if symptoms persist. Return to the ED with fever, trouble swallowing or breathing, or any other concerns.         MEDICATIONS GIVEN IN THE EMERGENCY DEPARTMENT:  Medications - No data to display      I have reviewed the nursing notes.  I have reviewed the findings, diagnosis, plan and need for follow up with the patient.        NEW PRESCRIPTIONS STARTED AT TODAY'S ER VISIT  New Prescriptions    GUAIFENESIN-DEXTROMETHORPHAN (ROBITUSSIN DM) 100-10 MG/5ML SYRUP    Take 10 mLs by mouth every 4 hours as needed for cough.       Final diagnoses:   Viral upper respiratory illness       1/5/2025   [facility] EMERGENCY DEPT  Condition on disposition: Stable    Recommended supportive cares to aid with symptoms including cough, head congestion nonproductive cough with fevers. Advised that if symptoms are not improving despite this treatment plan, then they should follow-up with primary provider for reevaluation. Strict UC/ED return precautions discussed in detail including prolonged fevers, development of shortness of breath or trouble with breathing, weakness or lightheadedness, inability to tolerate oral intake or anything else that is concerning to them.  All questions were answered. Pt verbalized understanding and agreement with the above plan.     Patient verbalized agreement with and understanding of the rational for the diagnosis and treatment plan.  All questions were answered to best of my ability and the patient's satisfaction. The patient was advised to contact or return to their primary clinic or UC/ED with any questions that may arise after this visit.      Disclaimer: This note consists of symbols derived from keyboarding, dictation, and/or voice recognition software. As a result, there may be errors in the script that have gone undetected.  Please consider this when interpreting information found in the chart.        Melonie Rosen, APRN CNP  01/05/25 [id number]

## 2025-01-05 NOTE — DISCHARGE INSTRUCTIONS
Ordered Robitussin for you for coughing as needed, recommend treating your fevers with Tylenol or ibuprofen.  Testing for RSV, COVID, influenza was negative today.  Symptoms appear viral.  Increase oral fluid intake that also help thin secretions with coughing.  Exam today your lungs are clear you have some drainage is draining down the back your throat thought to be from viral sinus congestion.  Low up in your primary clinic if your symptoms are not improving despite recommended treatment plan.  Should be without fever for 24 hours before returning to the community.

## 2025-02-02 ENCOUNTER — HOSPITAL ENCOUNTER (EMERGENCY)
Facility: CLINIC | Age: 30
Discharge: HOME | End: 2025-02-02
Payer: COMMERCIAL

## 2025-02-02 ENCOUNTER — APPOINTMENT (OUTPATIENT)
Dept: GENERAL RADIOLOGY | Facility: CLINIC | Age: 30
End: 2025-02-02
Payer: COMMERCIAL

## 2025-02-02 VITALS
OXYGEN SATURATION: 95 % | RESPIRATION RATE: 20 BRPM | HEART RATE: 134 BPM | SYSTOLIC BLOOD PRESSURE: 117 MMHG | TEMPERATURE: 101.4 F | DIASTOLIC BLOOD PRESSURE: 90 MMHG

## 2025-02-02 DIAGNOSIS — J10.1 INFLUENZA B: ICD-10-CM

## 2025-02-02 LAB
FLUAV RNA SPEC QL NAA+PROBE: NEGATIVE
FLUBV RNA RESP QL NAA+PROBE: POSITIVE
RSV RNA SPEC NAA+PROBE: NEGATIVE
SARS-COV-2 RNA RESP QL NAA+PROBE: NEGATIVE

## 2025-02-02 PROCEDURE — 87637 SARSCOV2&INF A&B&RSV AMP PRB: CPT

## 2025-02-02 PROCEDURE — G0463 HOSPITAL OUTPT CLINIC VISIT: HCPCS | Mod: 25

## 2025-02-02 PROCEDURE — 250N000013 HC RX MED GY IP 250 OP 250 PS 637

## 2025-02-02 PROCEDURE — 71046 X-RAY EXAM CHEST 2 VIEWS: CPT

## 2025-02-02 PROCEDURE — 99214 OFFICE O/P EST MOD 30 MIN: CPT

## 2025-02-02 RX ORDER — BENZONATATE 100 MG/1
100 CAPSULE ORAL 3 TIMES DAILY PRN
Qty: 15 CAPSULE | Refills: 0 | Status: SHIPPED | OUTPATIENT
Start: 2025-02-02

## 2025-02-02 RX ORDER — ALBUTEROL SULFATE 90 UG/1
2 INHALANT RESPIRATORY (INHALATION) EVERY 6 HOURS PRN
Qty: 18 G | Refills: 0 | Status: SHIPPED | OUTPATIENT
Start: 2025-02-02

## 2025-02-02 RX ORDER — IBUPROFEN 200 MG
600 TABLET ORAL ONCE
Status: COMPLETED | OUTPATIENT
Start: 2025-02-02 | End: 2025-02-02

## 2025-02-02 RX ADMIN — IBUPROFEN 600 MG: 200 TABLET, FILM COATED ORAL at 10:05

## 2025-02-02 ASSESSMENT — ACTIVITIES OF DAILY LIVING (ADL)
ADLS_ACUITY_SCORE: 41
ADLS_ACUITY_SCORE: 41

## 2025-02-02 NOTE — ED PROVIDER NOTES
History     Chief Complaint   Patient presents with    Cough     Cough, congestion , nausea , onset 1/31/2025  Cough since 1/5/2025, patient states he feels wheezy , especially when lying down        Braulio Ng is a 29 year old male with no significant pmhx who presents for evaluation of cough.  Patient states he first became sick with fever, cough, congestion, and nausea in early January and just after the new year.  He states he was sick for over 2 weeks straight, but last week he finally started to feel back to normal.  He states he felt fully improved but did have a persistent dry cough.  However, 3 days ago he felt sick again with new fever, congestion, rhinorrhea, and worsening cough.  He describes facial pressure but denies pain.  He also describes ear plugging/discomfort.  He has been taking Tylenol the past 3 days for his fever but has not been using any other medications.    Allergies:  Allergies   Allergen Reactions    Penicillins Nausea and Vomiting    Erythromycin Rash       Problem List:    There are no active problems to display for this patient.       Past Medical History:    Past Medical History:   Diagnosis Date    Anxiety        Past Surgical History:    No past surgical history on file.    Family History:    No family history on file.    Social History:  Marital Status:  Single [1]        Medications:    albuterol (PROAIR HFA/PROVENTIL HFA/VENTOLIN HFA) 108 (90 Base) MCG/ACT inhaler  benzonatate (TESSALON) 100 MG capsule  LORazepam (ATIVAN) 1 MG tablet  venlafaxine (EFFEXOR XR) 75 MG 24 hr capsule  venlafaxine (EFFEXOR XR) 75 MG 24 hr capsule          Physical Exam   BP: 117/90  Pulse: (!) 134  Temp: (!) 101.4  F (38.6  C)  Resp: 20  SpO2: 95 %      Physical Exam  Vitals and nursing note reviewed.   Constitutional:       Appearance: He is ill-appearing. He is not toxic-appearing or diaphoretic.   HENT:      Head: Normocephalic and atraumatic.      Right Ear: Tympanic membrane normal.       Left Ear: Tympanic membrane normal.      Nose: Congestion and rhinorrhea present.      Mouth/Throat:      Mouth: Mucous membranes are moist.      Pharynx: Oropharynx is clear. No oropharyngeal exudate or posterior oropharyngeal erythema.   Eyes:      Extraocular Movements: Extraocular movements intact.      Conjunctiva/sclera: Conjunctivae normal.      Pupils: Pupils are equal, round, and reactive to light.   Cardiovascular:      Rate and Rhythm: Regular rhythm. Tachycardia present.      Heart sounds: Normal heart sounds.   Pulmonary:      Effort: Pulmonary effort is normal.      Breath sounds: Normal breath sounds. No wheezing, rhonchi or rales.   Musculoskeletal:         General: Normal range of motion.      Cervical back: Normal range of motion. No rigidity.   Skin:     General: Skin is warm.   Neurological:      General: No focal deficit present.      Mental Status: He is alert and oriented to person, place, and time.   Psychiatric:         Mood and Affect: Mood normal.         Behavior: Behavior normal.         ED Course        Procedures                    Results for orders placed or performed during the hospital encounter of 02/02/25 (from the past 24 hours)   Influenza A/B, RSV and SARS-CoV2 PCR (COVID-19) Nasopharyngeal    Specimen: Nasopharyngeal; Swab   Result Value Ref Range    Influenza A PCR Negative Negative    Influenza B PCR Positive (A) Negative    RSV PCR Negative Negative    SARS CoV2 PCR Negative Negative    Narrative    Testing was performed using the Xpert Xpress CoV2/Flu/RSV Assay on the [employer] GeneXpert Instrument. This test should be ordered for the detection of SARS-CoV2, influenza, and RSV viruses in individuals with signs and symptoms of respiratory tract infection. This test is for in vitro diagnostic use under the [place] FDA for laboratories certified under CLIA to perform high or moderate complexity testing. This test has been [place] FDA cleared. A negative result does not rule out the  presence of PCR inhibitors in the specimen or target RNA in concentration below the limit of detection for the assay. If only one viral target is positive but coinfection with multiple targets is suspected, the sample should be re-tested with another FDA cleared, approved, or authorized test, if coninfection would change clinical management. This test was validated by the [facility]. These laboratories are certified under the Clinical Laboratory Improvement Amendments of 1988 (CLIA-88) as qualified to perfom high complexity laboratory testing.   XR Chest 2 Views    Narrative    EXAM: XR CHEST 2 VIEWS  LOCATION: [facility]  DATE: 2/2/2025    INDICATION: cough x4 weeks, new fever  COMPARISON: None.      Impression    IMPRESSION: Negative chest.       Medications   ibuprofen (ADVIL/MOTRIN) tablet 600 mg (600 mg Oral $Given 2/2/25 1005)       Assessments & Plan (with Medical Decision Making)     I have reviewed the nursing notes.    I have reviewed the findings, diagnosis, plan and need for follow up with the patient.    Medical Decision Making  Braulio Ng is a 29 year old male with no significant pmhx who presents for evaluation of cough.  Differential diagnoses include COVID, flu, RSV, other viral upper respiratory illness, pneumonia, bronchitis.  Vital signs with a fever of 101.4F and tachycardia of 134 likely due to fever.  He is normotensive, and he is satting 95% on room air with a regular respiratory rate and effort.    On examination patient is ill-appearing but alert, oriented, nontoxic.  He is able to speak in full sentences without difficulty and is breathing comfortably on room air.  He does have a frequent dry cough.  Bilateral ear exam negative for signs of infection.  He is congested with rhinorrhea.  No sinus tenderness to palpation.  Oropharyngeal exam negative for erythema, exudates, swelling.  Neck with full active range of motion, no rigidity.   Lungs are clear to auscultation bilaterally, heart sounds are normal.  Chest x-ray was obtained given several weeks of on and off symptoms, and this was negative for acute cardiopulmonary abnormality or pneumonia.  He did test positive for influenza B.  He tested negative for influenza A, RSV, and COVID.  Per chart review, patient was tested for flu, COVID, RSV with his initial illness in early January and tested negative for all 3.    Suspect that patient fell sick with new influenza B infection these last 3 days, and that this is not the continuation of his prior illness.  He did previously test negative for these viruses.  He also had a distinct several day period where he felt back to baseline except for ongoing mild cough.  He also reports that in the time that he was feeling better his roommate came home sick with a viral illness.  At this time would not recommend antibiotic therapy for possible bacterial sinusitis, especially given that he does not have unilateral facial pain, dental pain.  He is outside of the window for treatment with Tamiflu, so we discussed other symptomatic cares.  Recommended albuterol inhaler as needed for cough, as well as Tessalon Perles, cough drops, increased hydration, humidified air, honey with tea.  We discussed using by the counter Sudafed for congestion, and initiation of Flonase.  Also discussed option for nasal saline rinses.  He was instructed to return to the ER if he develops difficulty breathing, fever lasting greater than 5 days, unilateral facial pain, uncontrollable vomiting, or if symptoms have not started to improve after 7-10 days.  Patient voiced understanding of the plan and had no further questions.      New Prescriptions    ALBUTEROL (PROAIR HFA/PROVENTIL HFA/VENTOLIN HFA) 108 (90 BASE) MCG/ACT INHALER    Inhale 2 puffs into the lungs every 6 hours as needed for shortness of breath, wheezing or cough.    BENZONATATE (TESSALON) 100 MG CAPSULE    Take 1 capsule  (100 mg) by mouth 3 times daily as needed for cough.       Final diagnoses:   Influenza B       Stephenie Knutson PA-C  February 2, 2025  [facility] EMERGENCY DEPT     Stephenie Knutson PA-C  02/02/25 [id number]

## 2025-02-02 NOTE — DISCHARGE INSTRUCTIONS
For pain and fever I recommend taking Ibuprofen 600mg and/or Tylenol 500-1000mg every 6 hours as needed. You can alternate them so you are taking something every 3 hours (Ibuprofen 9AM, Tylenol 12PM, Ibuprofen 3PM, Tylenol 6PM, etc).     For congestion you can take Sudafed. Ask the pharmacist for the sudafed behind the counter (you have to show your license to purchase it). You can also try over the counter remedies such as neti-pot rinses 3-4 times a day, nasal saline spray.  If your congestion is persistent and is causing ear plugging/discomfort, I recommend starting Flonase nasal spray, 1 spray in each nostril daily.  This medication only works if it is used consistently for at least 3-5 days.  It can be used for as long as needed.  It works by decreasing nasal mucosa inflammation and mucus production to promote better drainage.    For cough you can use an albuterol inhaler as needed, tessalon pearls, cough drops, drink honey with tea, humidified air.     Push fluids to prevent dehydration. You can drink water, tea, liquid IV, gatorade, pedialyte, etc. Avoid caffeine or pop.     Return to the ER if you develop fever lasting greater than 5 days, severe headache, vision changes, neck stiffness/pain, uncontrollable vomiting, difficulty breathing, altered mental status/confusion, or if other concerning symptoms develop.